# Patient Record
Sex: FEMALE | Race: BLACK OR AFRICAN AMERICAN | Employment: UNEMPLOYED | URBAN - METROPOLITAN AREA
[De-identification: names, ages, dates, MRNs, and addresses within clinical notes are randomized per-mention and may not be internally consistent; named-entity substitution may affect disease eponyms.]

---
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
## 2023-01-19 ENCOUNTER — HOSPITAL ENCOUNTER (EMERGENCY)
Age: 18
Discharge: HOME | End: 2023-01-19
Attending: EMERGENCY MEDICINE
Payer: COMMERCIAL

## 2023-01-19 VITALS
BODY MASS INDEX: 42.22 KG/M2 | RESPIRATION RATE: 16 BRPM | WEIGHT: 209.44 LBS | HEART RATE: 110 BPM | HEIGHT: 59 IN | TEMPERATURE: 99.4 F | DIASTOLIC BLOOD PRESSURE: 69 MMHG | SYSTOLIC BLOOD PRESSURE: 108 MMHG | OXYGEN SATURATION: 98 %

## 2023-01-19 DIAGNOSIS — R50.9 FEBRILE ILLNESS: Primary | ICD-10-CM

## 2023-01-19 LAB
RAPID INFLUENZA  B AGN: NEGATIVE
RAPID INFLUENZA A AGN: NEGATIVE
S PYO AG THROAT QL: NEGATIVE
SARS-COV-2, NAAT: NOT DETECTED

## 2023-01-19 PROCEDURE — 87077 CULTURE AEROBIC IDENTIFY: CPT

## 2023-01-19 PROCEDURE — 87880 STREP A ASSAY W/OPTIC: CPT

## 2023-01-19 PROCEDURE — 87804 INFLUENZA ASSAY W/OPTIC: CPT

## 2023-01-19 PROCEDURE — 6370000000 HC RX 637 (ALT 250 FOR IP): Performed by: EMERGENCY MEDICINE

## 2023-01-19 PROCEDURE — 99283 EMERGENCY DEPT VISIT LOW MDM: CPT

## 2023-01-19 PROCEDURE — 87081 CULTURE SCREEN ONLY: CPT

## 2023-01-19 PROCEDURE — 87635 SARS-COV-2 COVID-19 AMP PRB: CPT

## 2023-01-19 RX ORDER — IBUPROFEN 600 MG/1
600 TABLET ORAL ONCE
Status: COMPLETED | OUTPATIENT
Start: 2023-01-19 | End: 2023-01-19

## 2023-01-19 RX ORDER — ACETAMINOPHEN 325 MG/1
650 TABLET ORAL ONCE
Status: COMPLETED | OUTPATIENT
Start: 2023-01-19 | End: 2023-01-19

## 2023-01-19 RX ORDER — IBUPROFEN 400 MG/1
400 TABLET ORAL EVERY 6 HOURS PRN
Qty: 20 TABLET | Refills: 0 | Status: SHIPPED | OUTPATIENT
Start: 2023-01-19

## 2023-01-19 RX ORDER — ACETAMINOPHEN 500 MG
500 TABLET ORAL 4 TIMES DAILY PRN
Qty: 120 TABLET | Refills: 0 | Status: SHIPPED | OUTPATIENT
Start: 2023-01-19

## 2023-01-19 RX ADMIN — IBUPROFEN 600 MG: 600 TABLET, FILM COATED ORAL at 22:36

## 2023-01-19 RX ADMIN — ACETAMINOPHEN 650 MG: 325 TABLET ORAL at 20:42

## 2023-01-19 ASSESSMENT — PAIN DESCRIPTION - ONSET: ONSET: ON-GOING

## 2023-01-19 ASSESSMENT — PAIN DESCRIPTION - DESCRIPTORS: DESCRIPTORS: ACHING

## 2023-01-19 ASSESSMENT — PAIN DESCRIPTION - ORIENTATION: ORIENTATION: ANTERIOR

## 2023-01-19 ASSESSMENT — PAIN SCALES - GENERAL
PAINLEVEL_OUTOF10: 8
PAINLEVEL_OUTOF10: 2

## 2023-01-19 ASSESSMENT — LIFESTYLE VARIABLES
HOW OFTEN DO YOU HAVE A DRINK CONTAINING ALCOHOL: NEVER
HOW MANY STANDARD DRINKS CONTAINING ALCOHOL DO YOU HAVE ON A TYPICAL DAY: PATIENT DOES NOT DRINK

## 2023-01-19 ASSESSMENT — PAIN DESCRIPTION - LOCATION: LOCATION: HEAD

## 2023-01-19 ASSESSMENT — PAIN - FUNCTIONAL ASSESSMENT: PAIN_FUNCTIONAL_ASSESSMENT: ACTIVITIES ARE NOT PREVENTED

## 2023-01-19 ASSESSMENT — PAIN DESCRIPTION - FREQUENCY: FREQUENCY: CONTINUOUS

## 2023-01-19 ASSESSMENT — PAIN DESCRIPTION - PAIN TYPE: TYPE: ACUTE PAIN

## 2023-01-20 NOTE — ED NOTES
After refusing earlier, patient has decided that she would like to be tested for Covid-19 and influenza. Orders placed and swabs sent to lab at this time.      Patient given cheetos and large glass of water upon request.      Taisha Alexander RN  01/19/23 0343

## 2023-01-20 NOTE — ED TRIAGE NOTES
Patient to the ER with complaints of headache, congestion, and fever intermittently for 4 days. She has been taking otc tylenol and ibuprofen but reports no headache relief. Denies any nausea, vomiting, or diarrhea. A&o x4 and ambulatory at time of triage. Patient last took nyquill this morning at 1100. She has a fever of 103.2 at time of triage.

## 2023-01-22 LAB
ORGANISM: ABNORMAL
S PYO THROAT QL CULT: ABNORMAL
S PYO THROAT QL CULT: ABNORMAL

## 2023-01-22 NOTE — RESULT ENCOUNTER NOTE
Culture reviewed, please contact patient and inform them of the results and call in clindamycin 300 mg p.o. 3 times daily x10 days.

## 2023-01-23 NOTE — ED PROVIDER NOTES
[facility] [facility] [employer]        Pt Name: Bran Hernandez  MRN: [id number]  Arm[place]ita 2005  Date of evaluation: 1/19/2023  Provider: Jaspreet Hughes MD  PCP: No primary care provider on file. Note Started: 10:07 PM EST 1/22/23    CHIEF COMPLAINT       Chief Complaint   Patient presents with    Headache    Fever       HISTORY OF PRESENT ILLNESS: 1 or more Elements   History From: Patient        Randy Allred is a 21 y.o. female who presents for evaluation of fevers headache and myalgias. Patient reports 2-day history of symptoms. States she has taken ibuprofen but has persistent fevers and the medication wears off. She had IV states she had intermittent headaches denies neck pain or neck stiffness. Denies changes in vision nausea or vomiting. Rash numbness or weakness. Reports that he does have a recent COVID exposure. She states he also is other sick contacts at home. She did not take any COVID testing prior to arrival.  Patient does state she has a sore throat. Denies difficulties breathing or swallowing    Nursing Notes were all reviewed and agreed with or any disagreements were addressed in the HPI. REVIEW OF SYSTEMS :      Review of Systems    Positives and Pertinent negatives as per HPI. SURGICAL HISTORY   History reviewed. No pertinent surgical history. [address]       Discharge Medication List as of 1/19/2023 11:23 PM          ALLERGIES     Amoxicillin    FAMILYHISTORY     History reviewed. No pertinent family history.      SOCIAL HISTORY       Social History     Tobacco Use    Smoking status: Never    Smokeless tobacco: Never   Vaping Use    Vaping Use: Never used   Substance Use Topics    Alcohol use: Never    Drug use: Never       SCREENINGS                         CIWA Assessment  BP: 108/69  Heart Rate: (!) 110           PHYSICAL EXAM  1 or more Elements     ED Triage Vitals [01/19/23 2030]   BP Temp Temp Source Heart Rate Resp SpO2 Height Weight - Scale   115/68 (!) 103.2 °F (39.6 °C) Oral (!) 148 18 98 % (!) 4' 11\" (1.499 m) 209 lb 7 oz (95 kg)       Physical Exam  HENT:      Mouth/Throat:      Mouth: Mucous membranes are moist.      Pharynx: Oropharynx is clear. No oropharyngeal exudate or posterior oropharyngeal erythema. Cardiovascular:      Pulses: Normal pulses. Musculoskeletal:      Cervical back: No rigidity or tenderness. Lymphadenopathy:      Cervical: No cervical adenopathy. Skin:     Capillary Refill: Capillary refill takes less than 2 seconds. Neurological:      General: No focal deficit present. Mental Status: She is oriented to person, place, and time. Cranial Nerves: No cranial nerve deficit. Sensory: No sensory deficit. Motor: No weakness. DIAGNOSTIC RESULTS   LABS:    Labs Reviewed   CULTURE, BETA STREP CONFIRM PLATES - Abnormal; Notable for the following components:       Result Value    Strep A Culture   (*)     Value: No Beta Strep Group A isolated  Moderate growth normal oral kong with      Organism Beta Strep Group G (*)     All other components within normal limits    Narrative:     ORDER#: [id number]                          ORDERED BY: Joann Guzman  SOURCE: Throat                             COLLECTED:  01/19/23 22:13  ANTIBIOTICS AT ANGI.:                      RECEIVED :  01/20/23 07:59   STREP SCREEN GROUP A THROAT   RAPID INFLUENZA A/B ANTIGENS   COVID-19, RAPID       When ordered only abnormal lab results are displayed. All other labs were within normal range or not returned as of this dictation.     EKG:     RADIOLOGY:   Non-plain film images such as CT, Ultrasound and MRI are read by the radiologist. Plain radiographic images are visualized and preliminarily interpreted by the ED Provider with the below findings:        Interpretation per the Radiologist below, if available at the time of this note:    Discussed with Radiologist:     No orders to display No results found. No results found. PROCEDURES   Unless otherwise noted below, none     Procedures    CRITICAL CARE TIME (.cct)       PAST MEDICAL HISTORY      has no past medical history on file. EMERGENCY DEPARTMENT COURSE and DIFFERENTIAL DIAGNOSIS/MDM:   Vitals:    Vitals:    01/19/23 2030 01/19/23 2245 01/19/23 2323   BP: 115/68 108/69    Pulse: (!) 148 (!) 121 (!) 110   Resp: 18 16 16   Temp: (!) 103.2 °F (39.6 °C) 99.4 °F (37.4 °C)    TempSrc: Oral Oral    SpO2: 98% 98%    Weight: 209 lb 7 oz (95 kg)     Height: (!) 4' 11\" (1.499 m)         Patient was given the following medications:  Medications   acetaminophen (TYLENOL) tablet 650 mg (650 mg Oral Given 1/19/23 2042)   ibuprofen (ADVIL;MOTRIN) tablet 600 mg (600 mg Oral Given 1/19/23 2236)             Is this patient to be included in the SEP-1 Core Measure due to severe sepsis or septic shock? No   Exclusion criteria - the patient is NOT to be included for SEP-1 Core Measure due to:  Viral etiology found or highly suspected (including COVID-19) without concomitant bacterial infection    CC/HPI Summary, DDx, ED Course, and Reassessment:       68-year-old female presents ED for evaluation of of fever myalgias and sore throat. She is complaining of intermittent headaches but currently has no headache. She is noted be febrile and tachycardic on arrival.  Denies chest pains or shortness of breath. Denies dysuria or hematuria. Patient has no meningeal signs on presentation. She did defervesce with antipyretics. Rapid strep flu and COVID were negative. On reevaluation patient states she has no additional symptoms. On reevaluation patient resting comfortably has no complaints. I suspect a viral etiology the patient's symptoms. She is febrile and tachycardic, but increased heart likely secondary to fever. No meningeal signs. Lungs good auscultation. No  symptoms.      Dispo discussed with patient is unable to discharge home with symptomatic treatment. CONSULTS: (Who and What was discussed)  None        Chronic Conditions:History reviewed. No pertinent past medical history. Records Reviewed (source and summary):     Disposition Considerations (include 1 Tests not done, Admit vs D/C, Shared Decision Making, Pt Expectation of Test or Tx.): Considered obtaining lumbar puncture but patient currently has no nuchal rigidity no headache and no focal deficits that have low suspicion for meningitis. Admission to the hospital considered but patient's symptoms are improving. Vital signs and testing performed is reassuring. Based on this patient is appropriate for outpatient management. No indication for admission at this time. Symptomatic treatment with expectant management discussed with the patient and/or family member or surrogates present and they are amenable to treatment plan and outpatient follow-up. Strict return precautions were discussed with the patient and those present. All parties involved were informed that condition may persist or worsen in which case they may then require inpatient treatment, currently there is no indication. They demonstrated understanding of when to return to the emergency department for new or worsening symptoms. I am the Primary Clinician of Record. FINAL IMPRESSION      1.  Febrile illness          DISPOSITION/PLAN     DISPOSITION Decision To Discharge 01/19/2023 11:21:10 PM      PATIENT REFERRED TO:  [facility]) Pre-Services  [phone]          DISCHARGE MEDICATIONS:  Discharge Medication List as of 1/19/2023 11:23 PM        START taking these medications    Details   acetaminophen (TYLENOL) 500 MG tablet Take 1 tablet by mouth 4 times daily as needed for Pain, Disp-120 tablet, R-0Normal      ibuprofen (ADVIL;MOTRIN) 400 MG tablet Take 1 tablet by mouth every 6 hours as needed for Pain, Disp-20 tablet, R-0Normal             DISCONTINUED MEDICATIONS:  Discharge Medication List as of 1/19/2023 11:23 PM                 (Please note that portions of this note were completed with a voice recognition program.  Efforts were made to edit the dictations but occasionally words are mis-transcribed.)    Get Hope MD (electronically signed)           Get Hope MD  01/22/23 9457

## 2023-01-25 NOTE — RESULT ENCOUNTER NOTE
Patient's positive result has been appropriately evaluated by the provider pool. Patient was unable to be reached over the phone. Unable to leave voicemail. Will await a return phone call. Will try to reach patient again tomorrow per protocol. Will send letter.

## 2024-07-24 ENCOUNTER — HOSPITAL ENCOUNTER (EMERGENCY)
Age: 19
Discharge: HOME | End: 2024-07-24
Payer: COMMERCIAL

## 2024-07-24 ENCOUNTER — APPOINTMENT (OUTPATIENT)
Dept: ULTRASOUND IMAGING | Age: 19
End: 2024-07-24
Payer: COMMERCIAL

## 2024-07-24 VITALS
WEIGHT: 209.44 LBS | DIASTOLIC BLOOD PRESSURE: 76 MMHG | RESPIRATION RATE: 16 BRPM | OXYGEN SATURATION: 100 % | BODY MASS INDEX: 41.12 KG/M2 | HEIGHT: 60 IN | SYSTOLIC BLOOD PRESSURE: 128 MMHG | HEART RATE: 107 BPM | TEMPERATURE: 98.1 F

## 2024-07-24 DIAGNOSIS — O20.9 VAGINAL BLEEDING IN PREGNANCY, FIRST TRIMESTER: Primary | ICD-10-CM

## 2024-07-24 DIAGNOSIS — O23.41 URINARY TRACT INFECTION IN MOTHER DURING FIRST TRIMESTER OF PREGNANCY: ICD-10-CM

## 2024-07-24 LAB
ABO + RH BLD: NORMAL
ANION GAP SERPL CALCULATED.3IONS-SCNC: 14 MMOL/L (ref 3–16)
B-HCG SERPL EIA 3RD IS-ACNC: 216.7 MIU/ML
BACTERIA GENITAL QL WET PREP: NORMAL
BACTERIA URNS QL MICRO: ABNORMAL /HPF
BASOPHILS # BLD: 0.1 K/UL (ref 0–0.2)
BASOPHILS NFR BLD: 0.8 %
BILIRUB UR QL STRIP.AUTO: ABNORMAL
BUN SERPL-MCNC: 7 MG/DL (ref 7–20)
CALCIUM SERPL-MCNC: 9.1 MG/DL (ref 8.3–10.6)
CHLORIDE SERPL-SCNC: 104 MMOL/L (ref 99–110)
CLARITY UR: ABNORMAL
CLUE CELLS SPEC QL WET PREP: NORMAL
CO2 SERPL-SCNC: 20 MMOL/L (ref 21–32)
COLOR UR: ABNORMAL
CREAT SERPL-MCNC: 0.7 MG/DL (ref 0.6–1.1)
DEPRECATED RDW RBC AUTO: 14.7 % (ref 12.4–15.4)
EOSINOPHIL # BLD: 0.1 K/UL (ref 0–0.6)
EOSINOPHIL NFR BLD: 1.3 %
EPI CELLS #/AREA URNS HPF: ABNORMAL /HPF (ref 0–5)
EPI CELLS SPEC QL WET PREP: NORMAL
GFR SERPLBLD CREATININE-BSD FMLA CKD-EPI: >90 ML/MIN/{1.73_M2}
GLUCOSE SERPL-MCNC: 105 MG/DL (ref 70–99)
GLUCOSE UR STRIP.AUTO-MCNC: NEGATIVE MG/DL
HCG UR QL: POSITIVE
HCT VFR BLD AUTO: 37.5 % (ref 36–48)
HGB BLD-MCNC: 12.8 G/DL (ref 12–16)
HGB UR QL STRIP.AUTO: ABNORMAL
KETONES UR STRIP.AUTO-MCNC: ABNORMAL MG/DL
LEUKOCYTE ESTERASE UR QL STRIP.AUTO: ABNORMAL
LYMPHOCYTES # BLD: 3.6 K/UL (ref 1–5.1)
LYMPHOCYTES NFR BLD: 41.4 %
MCH RBC QN AUTO: 28 PG (ref 26–34)
MCHC RBC AUTO-ENTMCNC: 34.1 G/DL (ref 31–36)
MCV RBC AUTO: 82.2 FL (ref 80–100)
MONOCYTES # BLD: 0.7 K/UL (ref 0–1.3)
MONOCYTES NFR BLD: 8.6 %
NEUTROPHILS # BLD: 4.1 K/UL (ref 1.7–7.7)
NEUTROPHILS NFR BLD: 47.9 %
NITRITE UR QL STRIP.AUTO: NEGATIVE
PH UR STRIP.AUTO: 6 [PH] (ref 5–8)
PLATELET # BLD AUTO: 419 K/UL (ref 135–450)
PMV BLD AUTO: 8.3 FL (ref 5–10.5)
POTASSIUM SERPL-SCNC: 3.8 MMOL/L (ref 3.5–5.1)
PROT UR STRIP.AUTO-MCNC: 30 MG/DL
RBC # BLD AUTO: 4.56 M/UL (ref 4–5.2)
RBC #/AREA URNS HPF: >100 /HPF (ref 0–4)
RBC SPEC QL WET PREP: NORMAL
SODIUM SERPL-SCNC: 138 MMOL/L (ref 136–145)
SP GR UR STRIP.AUTO: 1.02 (ref 1–1.03)
SPECIMEN SOURCE FLD: NORMAL
T VAGINALIS GENITAL QL WET PREP: NORMAL
UA COMPLETE W REFLEX CULTURE PNL UR: YES
UA DIPSTICK W REFLEX MICRO PNL UR: YES
URN SPEC COLLECT METH UR: ABNORMAL
UROBILINOGEN UR STRIP-ACNC: 0.2 E.U./DL
WBC # BLD AUTO: 8.6 K/UL (ref 4–11)
WBC #/AREA URNS HPF: ABNORMAL /HPF (ref 0–5)
WBC SPEC QL WET PREP: NORMAL
YEAST GENITAL QL WET PREP: NORMAL

## 2024-07-24 PROCEDURE — 84703 CHORIONIC GONADOTROPIN ASSAY: CPT

## 2024-07-24 PROCEDURE — 36415 COLL VENOUS BLD VENIPUNCTURE: CPT

## 2024-07-24 PROCEDURE — 87186 SC STD MICRODIL/AGAR DIL: CPT

## 2024-07-24 PROCEDURE — 86901 BLOOD TYPING SEROLOGIC RH(D): CPT

## 2024-07-24 PROCEDURE — 99284 EMERGENCY DEPT VISIT MOD MDM: CPT

## 2024-07-24 PROCEDURE — 87591 N.GONORRHOEAE DNA AMP PROB: CPT

## 2024-07-24 PROCEDURE — 87210 SMEAR WET MOUNT SALINE/INK: CPT

## 2024-07-24 PROCEDURE — 85025 COMPLETE CBC W/AUTO DIFF WBC: CPT

## 2024-07-24 PROCEDURE — 86900 BLOOD TYPING SEROLOGIC ABO: CPT

## 2024-07-24 PROCEDURE — 84702 CHORIONIC GONADOTROPIN TEST: CPT

## 2024-07-24 PROCEDURE — 76817 TRANSVAGINAL US OBSTETRIC: CPT

## 2024-07-24 PROCEDURE — 87077 CULTURE AEROBIC IDENTIFY: CPT

## 2024-07-24 PROCEDURE — 87491 CHLMYD TRACH DNA AMP PROBE: CPT

## 2024-07-24 PROCEDURE — 80048 BASIC METABOLIC PNL TOTAL CA: CPT

## 2024-07-24 PROCEDURE — 87086 URINE CULTURE/COLONY COUNT: CPT

## 2024-07-24 PROCEDURE — 81001 URINALYSIS AUTO W/SCOPE: CPT

## 2024-07-24 RX ORDER — PNV NO.95/FERROUS FUM/FOLIC AC 28MG-0.8MG
TABLET ORAL
Qty: 30 TABLET | Refills: 3 | Status: SHIPPED | OUTPATIENT
Start: 2024-07-24

## 2024-07-24 RX ORDER — CEFUROXIME AXETIL 250 MG/1
250 TABLET ORAL 2 TIMES DAILY
Qty: 10 TABLET | Refills: 0 | Status: SHIPPED | OUTPATIENT
Start: 2024-07-24 | End: 2024-07-29

## 2024-07-24 ASSESSMENT — ENCOUNTER SYMPTOMS
EYE PAIN: 0
COUGH: 0
SORE THROAT: 0
SHORTNESS OF BREATH: 0
NAUSEA: 0
COLOR CHANGE: 0
ABDOMINAL PAIN: 0
BACK PAIN: 0
VOMITING: 0

## 2024-07-24 NOTE — ED PROVIDER NOTES
[facility] EMERGENCY DEPARTMENT  EMERGENCY DEPARTMENT ENCOUNTER        Pt Name: Randy Sparrow  MRN: [id number]  Birthdate 2005  Date of evaluation: 7/24/2024  Provider: SAMAN Herbert  PCP: No primary care provider on file.  Note Started: 6:52 PM EDT 7/24/24      NETTE. I have evaluated this patient.        CHIEF COMPLAINT       Chief Complaint   Patient presents with    Vaginal Bleeding       HISTORY OF PRESENT ILLNESS: 1 or more Elements     History from : Patient    Limitations to history : None    Randy Sparrow is a 18 y.o. female who presents being instructed to come from [facility] for an ultrasound to rule out ectopic pregnancy.  She presents with her significant other.  She complains of vaginal bleeding with a home pregnancy test which was positive she is G1, P0 she tells me she had a shorter than normal menstrual period around July 19 is still having some bleeding with clots.  She denies fevers not really having abdominal pain or cramping currently.  Has not yet seen an OB/GYN.    Nursing Notes were all reviewed and agreed with or any disagreements were addressed in the HPI.    REVIEW OF SYSTEMS :      Review of Systems   Constitutional:  Negative for fever.   Respiratory:  Negative for shortness of breath.    Gastrointestinal:  Negative for abdominal pain and vomiting.   Genitourinary:  Positive for vaginal bleeding. Negative for dysuria.   Musculoskeletal:  Negative for back pain.   Skin:  Negative for color change and wound.   Neurological:  Negative for weakness.   Psychiatric/Behavioral:  Negative for agitation, behavioral problems and confusion.      Positives and Pertinent negatives as per HPI.     SURGICAL HISTORY   History reviewed. No pertinent surgical history.    CURRENTMEDICATIONS       Discharge Medication List as of 7/24/2024  9:56 PM        CONTINUE these medications which have NOT CHANGED    Details   acetaminophen (TYLENOL) 500 MG tablet Take

## 2024-07-24 NOTE — ED NOTES
Patient presents to ED with vaginal bleeding. States she had a positive pregnancy a few days ago, and now passing clots. Patient appears very anxious and HR is elevated. This is her first pregnancy. GCS 15.

## 2024-07-24 NOTE — ED NOTES
Patient transferred to [facility] ED for US via private vehicle. Reinforced to patient to go directly to [facility] and not eat or drink anything. She verbalized understanding.

## 2024-07-24 NOTE — ED PROVIDER NOTES
**ADVANCED PRACTICE PROVIDER, I HAVE EVALUATED THIS PATIENT**        [facility]  EMERGENCY DEPARTMENT ENCOUNTER      Pt Name: Randy Sparrow  MRN:[id number]  Birthdate 2005  Date of evaluation: 2024  Provider: Gilberto Ortega PA-C  Note Started: 5:11 PM EDT 24        Chief Complaint:    Chief Complaint   Patient presents with    Vaginal Bleeding         Nursing Notes, Past Medical Hx, Past Surgical Hx, Social Hx, Allergies, and Family Hx were all reviewed and agreed with or any disagreements were addressed in the HPI.    HPI: (Location, Duration, Timing, Severity, Quality, Assoc Sx, Context, Modifying factors)    History From: Patient  Limitations to history : None    Social Determinants Significantly Affecting Health : None    Chief Complaint of vaginal bleeding in pregnancy.  Patient states she started Friday.  Says she had a pregnancy test and it was positive at home.  Said her last menstrual period was on .  She states she is really not have any abdominal pain at this time.  She still spotting a little bit.  Is not passing large clots.  States she is barely going through a pad per day.  No nausea vomiting.  This is her first pregnancy.  Says she is G1, .  No chest pain, no extremity pain or weakness.  No other complaints    This is a  18 y.o. female who presents to the emergency room with the above complaint    PastMedical/Surgical History:  History reviewed. No pertinent past medical history.  History reviewed. No pertinent surgical history.    Medications:  Previous Medications    ACETAMINOPHEN (TYLENOL) 500 MG TABLET    Take 1 tablet by mouth 4 times daily as needed for Pain    IBUPROFEN (ADVIL;MOTRIN) 400 MG TABLET    Take 1 tablet by mouth every 6 hours as needed for Pain       Review of Systems:  (1 systems needed)  Review of Systems   Constitutional:  Negative for chills and fever.   HENT:  Negative for congestion and sore throat.    Eyes:

## 2024-07-25 LAB
C TRACH DNA CVX QL NAA+PROBE: NEGATIVE
N GONORRHOEA DNA CERV MUCUS QL NAA+PROBE: NEGATIVE

## 2024-07-25 NOTE — ED NOTES
D/C: Order noted for d/c. Pt confirmed d/c paperwork has correct name. Discharge and education instructions reviewed with patient. Teach-back successful.  Pt verbalized understanding and denied questions at this time. No acute distress noted. Patient instructed to follow-up as noted - return to emergency department if symptoms worsen. Patient verbalized understanding. Discharged per EDMD with discharge instructions. Pt discharged to private vehicle. Patient stable upon departure. Thanked patient for choosing [facility] for care. Denies pain at the time of discharge

## 2024-07-26 LAB
BACTERIA UR CULT: ABNORMAL
BACTERIA UR CULT: ABNORMAL
ORGANISM: ABNORMAL